# Patient Record
Sex: FEMALE | Race: WHITE | NOT HISPANIC OR LATINO | Employment: OTHER | URBAN - METROPOLITAN AREA
[De-identification: names, ages, dates, MRNs, and addresses within clinical notes are randomized per-mention and may not be internally consistent; named-entity substitution may affect disease eponyms.]

---

## 2021-02-11 ENCOUNTER — TELEPHONE ENCOUNTER (OUTPATIENT)
Dept: URBAN - METROPOLITAN AREA CLINIC 35 | Facility: CLINIC | Age: 71
End: 2021-02-11

## 2021-02-13 LAB
ABSOLUTE BASOPHILS: 62
ABSOLUTE EOSINOPHILS: 139
ABSOLUTE LYMPHOCYTES: 2433
ABSOLUTE MONOCYTES: 477
ABSOLUTE NEUTROPHILS: 4589
BASOPHILS: 0.8
EOSINOPHILS: 1.8
HEMATOCRIT: 43.8
HEMOGLOBIN: 15.1
LYMPHOCYTES: 31.6
MCH: 31.3
MCHC: 34.5
MCV: 90.9
MONOCYTES: 6.2
MPV: 10.9
NEUTROPHILS: 59.6
PLATELET COUNT: 278
RDW: 12.3
RED BLOOD CELL COUNT: 4.82
WHITE BLOOD CELL COUNT: 7.7

## 2021-02-15 ENCOUNTER — TELEPHONE ENCOUNTER (OUTPATIENT)
Dept: URBAN - METROPOLITAN AREA CLINIC 35 | Facility: CLINIC | Age: 71
End: 2021-02-15

## 2021-02-15 ENCOUNTER — OFFICE VISIT (OUTPATIENT)
Dept: URBAN - METROPOLITAN AREA CLINIC 35 | Facility: CLINIC | Age: 71
End: 2021-02-15

## 2021-02-15 VITALS
WEIGHT: 187 LBS | DIASTOLIC BLOOD PRESSURE: 61 MMHG | SYSTOLIC BLOOD PRESSURE: 121 MMHG | BODY MASS INDEX: 26.77 KG/M2 | HEIGHT: 70 IN

## 2021-02-15 RX ORDER — OXYCODONE HYDROCHLORIDE AND ACETAMINOPHEN 7.5; 325 MG/1; MG/1
1 TABLET AS NEEDED TABLET ORAL
Status: ACTIVE | COMMUNITY

## 2021-02-15 RX ORDER — HYDROCHLOROTHIAZIDE 25 MG/1
1 TABLET IN THE MORNING TABLET ORAL ONCE A DAY
Qty: 30 | Status: ACTIVE | COMMUNITY

## 2021-02-15 RX ORDER — SUCRALFATE 1 G/10ML
10 ML ON AN EMPTY STOMACH SUSPENSION ORAL
Qty: 1200 ML | Refills: 2 | Status: ACTIVE | COMMUNITY
Start: 2013-07-25

## 2021-02-15 RX ORDER — SODIUM PICOSULFATE, MAGNESIUM OXIDE, AND ANHYDROUS CITRIC ACID 10; 3.5; 12 MG/160ML; G/160ML; G/160ML
AS DIRECTED LIQUID ORAL
Qty: 1 KIT | Refills: 0 | Status: ON HOLD | COMMUNITY
Start: 2020-01-02

## 2021-02-15 RX ORDER — CITALOPRAM 20 MG/1
1 TABLET TABLET, FILM COATED ORAL ONCE A DAY
Qty: 30 | Status: ACTIVE | COMMUNITY

## 2021-02-15 RX ORDER — SUCRALFATE 1 G/1
1 TABLET BEFORE MEALS AND AT BEDTIME TABLET ORAL
Qty: 360 TABLET | Refills: 0 | Status: ACTIVE | COMMUNITY
Start: 2020-04-21

## 2021-02-15 RX ORDER — ZOLPIDEM TARTRATE 10 MG/1
1 TABLET AT BEDTIME AS NEEDED TABLET, FILM COATED ORAL ONCE A DAY
Status: ACTIVE | COMMUNITY

## 2021-02-15 RX ORDER — ATORVASTATIN CALCIUM 20 MG/1
1 TABLET TABLET, FILM COATED ORAL ONCE A DAY
Qty: 30 | Status: ACTIVE | COMMUNITY

## 2021-02-15 RX ORDER — COLESEVELAM HYDROCHLORIDE 3.75 G/1
1 PACKET MIXED WITH WATER WITH A MEAL FOR SUSPENSION ORAL ONCE A DAY
Qty: 30 | Refills: 3 | Status: ACTIVE | COMMUNITY
Start: 2020-01-02

## 2021-02-15 RX ORDER — SODIUM PICOSULFATE, MAGNESIUM OXIDE, AND ANHYDROUS CITRIC ACID 10; 3.5; 12 MG/160ML; G/160ML; G/160ML
160 ML LIQUID ORAL
Qty: 1 KIT | Refills: 0 | OUTPATIENT
Start: 2021-02-15

## 2021-02-15 RX ORDER — LEVOTHYROXINE SODIUM 0.09 MG/1
1 TABLET ON AN EMPTY STOMACH IN THE MORNING TABLET ORAL ONCE A DAY
Status: ACTIVE | COMMUNITY

## 2021-02-15 RX ORDER — SODIUM PICOSULFATE, MAGNESIUM OXIDE, AND ANHYDROUS CITRIC ACID 10; 3.5; 12 MG/160ML; G/160ML; G/160ML
AS DIRECTED LIQUID ORAL
Qty: 1 KIT | Refills: 0 | Status: ON HOLD | COMMUNITY
Start: 2019-10-02

## 2021-02-15 NOTE — HPI-MIGRATED HPI
;   ;     Melena : 71 y/o female patient presents today for a follow up of melena. Patient admits taking Pepto Bismol twice prior to this epiosde. She only had one episode of melena, and her CBC is normal.  Of note 3/23/2020 Patient c/o having darker loose stools, (not black) in color, but close to it over the past 2 days.  She took a dose of the Carafate liquid yesterday and noticed her stool color was improved.  She would like a refill of the Carafate 1gm suspension.  She denies current use of Pepto Bismol or Iron supplements. Admits to having occasional bloating/gas,  Abdominal pain that's on her left side sometimes either at the top or bottom of her abdomen.  She still continues to have diarrhea.  Currently reports 1 bowel movement per day with normal to loose with little form stools without blood or mucus. She admits melena. She admits the melena has since resolved since she stopped taking Peptol Bismol. Admits pruritus ani and rectal pain. She uses Reticare for relief of symptoms.;   Surveillance Colonoscopy : Patient is due for colonoscopy again, last one was 2020.  She was told to repeat in a year.  She admits to one BM daily.     Patient presents today for a repeat H&P for a colonoscopy which she has scheduled on 01/06/2020 with Dr. German. She currently admits one BM per day with some watery stools. She denies any changes since her last visit.  On last visit 10/02/2019, Patient is a 69-year-old  female who presents today for consultation for a colonoscopy. She has a personal history of colonic polyps and her last colonoscopy was in 2016 w/ Dr. German. Patient currently admits one bowel movement per day. Stools are soft and sometimes watery. Patient does admit occasional symptoms of bloating.  Patient admits to diarrhea she's had for years, even prior to her last colonoscopy.  COLONOSCOPY-IH 10/10/2016 revealed in the rectum a 6.5 mm polyp was seen and removed. First degree hemorrhoids. Mild to moderate diverticulosis in the sigmoid colon and descending colon. In the ascending colon x3 (9 mm, 6.5 mm and 4.5 mm) polyps seen and removed. Ileum not seen.   Pathology - Colonoscopy 10/10/2016 Colon, Ascending, Polyps (Polypectomy): Tubular adenoma X 2. Rectum, Polyp (Polypectomy): Benign colonic mucosa with lymphoid aggregate. No diagnostic evidence of hyperplastic or adenomatous epithelial changes.  ;

## 2021-02-18 ENCOUNTER — TELEPHONE ENCOUNTER (OUTPATIENT)
Dept: URBAN - METROPOLITAN AREA CLINIC 35 | Facility: CLINIC | Age: 71
End: 2021-02-18

## 2021-02-22 ENCOUNTER — TELEPHONE ENCOUNTER (OUTPATIENT)
Dept: URBAN - METROPOLITAN AREA CLINIC 35 | Facility: CLINIC | Age: 71
End: 2021-02-22

## 2021-03-12 ENCOUNTER — TELEPHONE ENCOUNTER (OUTPATIENT)
Dept: URBAN - METROPOLITAN AREA CLINIC 35 | Facility: CLINIC | Age: 71
End: 2021-03-12

## 2021-03-25 LAB
CALPROTECTIN, STOOL: 153
CLOSTRIDIUM DIFFICILE: (no result)
CONCENTRATE (1): (no result)
CULTURE, STOOL, SAL/SHIG/CAMPY AND SHIGA TOXINS EIA W/RFL E.COLI O157 CULT: (no result)
EIA (1): (no result)
TRICHROME (1): (no result)

## 2021-03-29 ENCOUNTER — TELEPHONE ENCOUNTER (OUTPATIENT)
Dept: URBAN - METROPOLITAN AREA CLINIC 35 | Facility: CLINIC | Age: 71
End: 2021-03-29

## 2021-04-05 ENCOUNTER — OFFICE VISIT (OUTPATIENT)
Dept: URBAN - METROPOLITAN AREA SURGERY CENTER 8 | Facility: SURGERY CENTER | Age: 71
End: 2021-04-05

## 2021-04-19 ENCOUNTER — OFFICE VISIT (OUTPATIENT)
Dept: URBAN - METROPOLITAN AREA CLINIC 35 | Facility: CLINIC | Age: 71
End: 2021-04-19

## 2021-04-19 VITALS
SYSTOLIC BLOOD PRESSURE: 138 MMHG | OXYGEN SATURATION: 100 % | BODY MASS INDEX: 27.06 KG/M2 | WEIGHT: 189 LBS | DIASTOLIC BLOOD PRESSURE: 78 MMHG | TEMPERATURE: 99 F | HEIGHT: 70 IN | HEART RATE: 78 BPM

## 2021-04-19 RX ORDER — ATORVASTATIN CALCIUM 20 MG/1
1 TABLET TABLET, FILM COATED ORAL ONCE A DAY
Qty: 30 | Status: ACTIVE | COMMUNITY

## 2021-04-19 RX ORDER — HYDROCHLOROTHIAZIDE 25 MG/1
1 TABLET IN THE MORNING TABLET ORAL ONCE A DAY
Qty: 30 | Status: ACTIVE | COMMUNITY

## 2021-04-19 RX ORDER — OXYCODONE HYDROCHLORIDE AND ACETAMINOPHEN 7.5; 325 MG/1; MG/1
1 TABLET AS NEEDED TABLET ORAL
Status: ON HOLD | COMMUNITY

## 2021-04-19 RX ORDER — SUCRALFATE 1 G/1
1 TABLET BEFORE MEALS AND AT BEDTIME TABLET ORAL
Qty: 360 TABLET | Refills: 0 | Status: ON HOLD | COMMUNITY
Start: 2020-04-21

## 2021-04-19 RX ORDER — SODIUM PICOSULFATE, MAGNESIUM OXIDE, AND ANHYDROUS CITRIC ACID 10; 3.5; 12 MG/160ML; G/160ML; G/160ML
AS DIRECTED LIQUID ORAL
Qty: 1 KIT | Refills: 0 | Status: ON HOLD | COMMUNITY
Start: 2020-01-02

## 2021-04-19 RX ORDER — SUCRALFATE 1 G/10ML
10 ML ON AN EMPTY STOMACH SUSPENSION ORAL
Qty: 1200 ML | Refills: 2 | Status: ACTIVE | COMMUNITY
Start: 2013-07-25

## 2021-04-19 RX ORDER — CITALOPRAM 40 MG/1
1 TABLET TABLET, FILM COATED ORAL ONCE A DAY
Status: ACTIVE | COMMUNITY

## 2021-04-19 RX ORDER — ZOLPIDEM TARTRATE 10 MG/1
1 TABLET AT BEDTIME AS NEEDED TABLET, FILM COATED ORAL ONCE A DAY
Status: ACTIVE | COMMUNITY

## 2021-04-19 RX ORDER — SODIUM PICOSULFATE, MAGNESIUM OXIDE, AND ANHYDROUS CITRIC ACID 10; 3.5; 12 MG/160ML; G/160ML; G/160ML
160 ML LIQUID ORAL
Qty: 1 KIT | Refills: 0 | Status: ON HOLD | COMMUNITY
Start: 2021-02-15

## 2021-04-19 RX ORDER — SODIUM PICOSULFATE, MAGNESIUM OXIDE, AND ANHYDROUS CITRIC ACID 10; 3.5; 12 MG/160ML; G/160ML; G/160ML
AS DIRECTED LIQUID ORAL
Qty: 1 KIT | Refills: 0 | Status: ON HOLD | COMMUNITY
Start: 2019-10-02

## 2021-04-19 RX ORDER — LEVOTHYROXINE SODIUM 0.09 MG/1
1 TABLET ON AN EMPTY STOMACH IN THE MORNING TABLET ORAL ONCE A DAY
Status: ACTIVE | COMMUNITY

## 2021-04-19 RX ORDER — COLESEVELAM HYDROCHLORIDE 3.75 G/1
1 PACKET MIXED WITH WATER WITH A MEAL FOR SUSPENSION ORAL ONCE A DAY
Qty: 30 | Refills: 3 | Status: ON HOLD | COMMUNITY
Start: 2020-01-02

## 2021-04-19 NOTE — HPI-MIGRATED HPI
;   ;     Melena : No episode of melena of since her last visit.    Last visit 02/15/2021 69 y/o female patient presents today for a follow up of melena. Patient admits taking Pepto Bismol twice prior to this episode. She only had one episode of melena, and her CBC is normal.  Of note 3/23/2020 Patient c/o having darker loose stools, (not black) in color, but close to it over the past 2 days.  She took a dose of the Carafate liquid yesterday and noticed her stool color was improved.  She would like a refill of the Carafate GM suspension.  She denies current use of Pepto Bismol or Iron supplements. Admits to having occasional bloating/gas,  Abdominal pain that's on her left side sometimes either at the top or bottom of her abdomen.  She still continues to have diarrhea.  Currently reports 1 bowel movement per day with normal to loose with little form stools without blood or mucus. She admits melena. She admits the melena has since resolved since she stopped taking Peptol Bismol. Admits pruritus ani and rectal pain. She uses Retic[employer] for relief of symptoms.;   Surveillance Colonoscopy :           Patient presents today for follow up to her colonoscopy.  Her colonoscopy was completed on 04/05/2021 by Dr German results noted below.  She denies any complications after her procedure. Currently has 1-2 bowel movements per day. Stools are loose with little form without any melena, blood or mucus.     Last visit 02/15/2021 Patient is due for colonoscopy again, last one was 2020.  She was told to repeat in a year.  She admits to one BM daily.     Patient presents today for a repeat H/P for a colonoscopy which she has scheduled on 01/06/2020 with Dr. German. She currently admits one BM per day with some watery stools. She denies any changes since her last visit.  On last visit 10/02/2019, Patient is a 69-year-old  female who presents today for consultation for a colonoscopy. She has a personal history of colonic polyps and her last colonoscopy was in 2016 w/ Dr. German. Patient currently admits one bowel movement per day. Stools are soft and sometimes watery. Patient does admit occasional symptoms of bloating.  Patient admits to diarrhea she's had for years, even prior to her last colonoscopy.  COLONOSCOPY- 10/10/2016 revealed in the rectum a 6.5 mm polyp was seen and removed. First degree hemorrhoids. Mild to moderate diverticulosis in the sigmoid colon and descending colon. In the ascending colon x3 (9 mm, 6.5 mm and 4.5 mm) polyps seen and removed. Ileum not seen.   Pathology - Colonoscopy 10/10/2016 Colon, Ascending, Polyps (Polypectomy): Tubular adenoma X 2. Rectum, Polyp (Polypectomy): Benign colonic mucosa with lymphoid aggregate. No diagnostic evidence of hyperplastic or adenomatous epithelial changes.;

## 2023-01-20 ENCOUNTER — TELEPHONE ENCOUNTER (OUTPATIENT)
Dept: URBAN - METROPOLITAN AREA CLINIC 35 | Facility: CLINIC | Age: 73
End: 2023-01-20

## 2023-01-20 ENCOUNTER — OFFICE VISIT (OUTPATIENT)
Dept: URBAN - METROPOLITAN AREA CLINIC 35 | Facility: CLINIC | Age: 73
End: 2023-01-20
Payer: MEDICARE

## 2023-01-20 VITALS
OXYGEN SATURATION: 97 % | WEIGHT: 190 LBS | BODY MASS INDEX: 27.2 KG/M2 | HEART RATE: 78 BPM | DIASTOLIC BLOOD PRESSURE: 72 MMHG | HEIGHT: 70 IN | SYSTOLIC BLOOD PRESSURE: 106 MMHG

## 2023-01-20 DIAGNOSIS — R10.12 LEFT UPPER QUADRANT ABDOMINAL PAIN: ICD-10-CM

## 2023-01-20 DIAGNOSIS — Z86.010 PERSONAL HISTORY OF COLONIC POLYPS: ICD-10-CM

## 2023-01-20 DIAGNOSIS — K59.1 FUNCTIONAL DIARRHEA: ICD-10-CM

## 2023-01-20 DIAGNOSIS — R14.3 FLATULENCE: ICD-10-CM

## 2023-01-20 PROBLEM — 428283002 HISTORY OF POLYP OF COLON (SITUATION): Status: ACTIVE | Noted: 2019-10-02

## 2023-01-20 PROCEDURE — 99214 OFFICE O/P EST MOD 30 MIN: CPT | Performed by: PHYSICIAN ASSISTANT

## 2023-01-20 RX ORDER — ZOLPIDEM TARTRATE 10 MG/1
1 TABLET AT BEDTIME AS NEEDED TABLET, FILM COATED ORAL ONCE A DAY
Status: ON HOLD | COMMUNITY

## 2023-01-20 RX ORDER — OXYCODONE HYDROCHLORIDE AND ACETAMINOPHEN 7.5; 325 MG/1; MG/1
1 TABLET AS NEEDED TABLET ORAL
Status: ON HOLD | COMMUNITY

## 2023-01-20 RX ORDER — LEVOTHYROXINE SODIUM 0.09 MG/1
1 TABLET ON AN EMPTY STOMACH IN THE MORNING TABLET ORAL ONCE A DAY
Status: ACTIVE | COMMUNITY

## 2023-01-20 RX ORDER — SUCRALFATE 1 G/10ML
10 ML ON AN EMPTY STOMACH SUSPENSION ORAL
Qty: 1200 ML | Refills: 2 | Status: DISCONTINUED | COMMUNITY
Start: 2013-07-25

## 2023-01-20 RX ORDER — SUCRALFATE 1 G/1
1 TABLET BEFORE MEALS AND AT BEDTIME TABLET ORAL
Qty: 360 TABLET | Refills: 0 | Status: ON HOLD | COMMUNITY
Start: 2020-04-21

## 2023-01-20 RX ORDER — ATORVASTATIN CALCIUM 20 MG/1
1 TABLET TABLET, FILM COATED ORAL ONCE A DAY
Qty: 30 | Status: ACTIVE | COMMUNITY

## 2023-01-20 RX ORDER — SUCRALFATE 1 G/10ML
10 ML ON AN EMPTY STOMACH SUSPENSION ORAL TWICE A DAY
Qty: 600 | Refills: 0 | OUTPATIENT
Start: 2023-01-20 | End: 2023-02-19

## 2023-01-20 RX ORDER — COLESEVELAM HYDROCHLORIDE 3.75 G/1
1 PACKET MIXED WITH WATER WITH A MEAL FOR SUSPENSION ORAL ONCE A DAY
Qty: 30 | Refills: 3 | Status: ON HOLD | COMMUNITY
Start: 2020-01-02

## 2023-01-20 RX ORDER — CITALOPRAM 40 MG/1
1 TABLET TABLET, FILM COATED ORAL ONCE A DAY
Status: ACTIVE | COMMUNITY

## 2023-01-20 RX ORDER — SODIUM PICOSULFATE, MAGNESIUM OXIDE, AND ANHYDROUS CITRIC ACID 10; 3.5; 12 MG/160ML; G/160ML; G/160ML
AS DIRECTED LIQUID ORAL
Qty: 1 KIT | Refills: 0 | Status: ON HOLD | COMMUNITY
Start: 2020-01-02

## 2023-01-20 RX ORDER — HYDROCHLOROTHIAZIDE 25 MG/1
1 TABLET IN THE MORNING TABLET ORAL ONCE A DAY
Qty: 30 | Status: ACTIVE | COMMUNITY

## 2023-01-20 NOTE — HPI-SURVEILLANCE COLONOSCOPY
71 y/o female patient presents today for a surveillance colonoscopy. Her last colonoscopy was in 2021 with findings of colon polyps. She admits a family history of colon polyps. Currently reports one to two bowel movements per day without strain. Her stools are diarrhea  without blood, mucus, or melena. She denies any episodes of rectal pain or pruritus ani. Patient has a prolapsed uterus, colon and bladder.

## 2023-01-20 NOTE — HPI-GAS
Patient admits to being gaseous.  She states stools are normal or diarrhea, but that's typical for her.  She has been having abd pain in her lower abdomen, possibly from the rectal proplapse stage 4.  She has pain in her LUQ as well.    She denies any heartburn.  She does state she has a dull ache there that's intermittent. She denies any provoking or palliative factors.  She will take Hydrocodone prn which helps the pain. History of bleeding gastric ulcer.

## 2023-02-17 ENCOUNTER — TELEPHONE ENCOUNTER (OUTPATIENT)
Dept: URBAN - METROPOLITAN AREA CLINIC 35 | Facility: CLINIC | Age: 73
End: 2023-02-17

## 2023-02-17 RX ORDER — SUCRALFATE 1 G/10ML
10 ML ON AN EMPTY STOMACH SUSPENSION ORAL TWICE A DAY
Qty: 600 | Refills: 0
Start: 2023-01-20 | End: 2023-03-19

## 2023-03-15 ENCOUNTER — TELEPHONE ENCOUNTER (OUTPATIENT)
Dept: URBAN - METROPOLITAN AREA CLINIC 35 | Facility: CLINIC | Age: 73
End: 2023-03-15

## 2023-05-03 ENCOUNTER — TELEPHONE ENCOUNTER (OUTPATIENT)
Dept: URBAN - METROPOLITAN AREA CLINIC 35 | Facility: CLINIC | Age: 73
End: 2023-05-03

## 2023-05-03 RX ORDER — SUCRALFATE 1 G/1
1 TABLET BEFORE MEALS AND AT BEDTIME TABLET ORAL
Qty: 120 | Refills: 0
Start: 2020-04-21 | End: 2023-06-02

## 2023-05-22 ENCOUNTER — TELEPHONE ENCOUNTER (OUTPATIENT)
Dept: URBAN - METROPOLITAN AREA CLINIC 35 | Facility: CLINIC | Age: 73
End: 2023-05-22

## 2023-07-06 ENCOUNTER — LAB OUTSIDE AN ENCOUNTER (OUTPATIENT)
Dept: URBAN - METROPOLITAN AREA CLINIC 19 | Facility: CLINIC | Age: 73
End: 2023-07-06

## 2023-07-06 ENCOUNTER — OFFICE VISIT (OUTPATIENT)
Dept: URBAN - METROPOLITAN AREA CLINIC 19 | Facility: CLINIC | Age: 73
End: 2023-07-06
Payer: MEDICARE

## 2023-07-06 ENCOUNTER — WEB ENCOUNTER (OUTPATIENT)
Dept: URBAN - METROPOLITAN AREA CLINIC 19 | Facility: CLINIC | Age: 73
End: 2023-07-06

## 2023-07-06 VITALS
DIASTOLIC BLOOD PRESSURE: 84 MMHG | SYSTOLIC BLOOD PRESSURE: 132 MMHG | HEIGHT: 70 IN | OXYGEN SATURATION: 97 % | TEMPERATURE: 97.3 F | WEIGHT: 194.8 LBS | HEART RATE: 81 BPM | BODY MASS INDEX: 27.89 KG/M2

## 2023-07-06 DIAGNOSIS — K83.8 COMMON BILE DUCT DILATATION: ICD-10-CM

## 2023-07-06 DIAGNOSIS — Z86.010 PERSONAL HISTORY OF COLONIC POLYPS: ICD-10-CM

## 2023-07-06 DIAGNOSIS — R19.5 POSITIVE COLORECTAL CANCER SCREENING USING COLOGUARD TEST: ICD-10-CM

## 2023-07-06 PROBLEM — 123608004: Status: ACTIVE | Noted: 2023-07-06

## 2023-07-06 PROCEDURE — 99214 OFFICE O/P EST MOD 30 MIN: CPT | Performed by: NURSE PRACTITIONER

## 2023-07-06 RX ORDER — CITALOPRAM 40 MG/1
1 TABLET TABLET, FILM COATED ORAL ONCE A DAY
Status: ACTIVE | COMMUNITY

## 2023-07-06 RX ORDER — COLESEVELAM HYDROCHLORIDE 3.75 G/1
1 PACKET MIXED WITH WATER WITH A MEAL FOR SUSPENSION ORAL ONCE A DAY
Qty: 30 | Refills: 3 | Status: ACTIVE | COMMUNITY
Start: 2020-01-02

## 2023-07-06 RX ORDER — ZOLPIDEM TARTRATE 10 MG/1
1 TABLET AT BEDTIME AS NEEDED TABLET, FILM COATED ORAL ONCE A DAY
Status: ACTIVE | COMMUNITY

## 2023-07-06 RX ORDER — LEVOTHYROXINE SODIUM 0.09 MG/1
1 TABLET ON AN EMPTY STOMACH IN THE MORNING TABLET ORAL ONCE A DAY
Status: ACTIVE | COMMUNITY

## 2023-07-06 RX ORDER — OXYCODONE HYDROCHLORIDE AND ACETAMINOPHEN 7.5; 325 MG/1; MG/1
1 TABLET AS NEEDED TABLET ORAL
Status: ACTIVE | COMMUNITY

## 2023-07-06 RX ORDER — SODIUM PICOSULFATE, MAGNESIUM OXIDE, AND ANHYDROUS CITRIC ACID 10; 3.5; 12 MG/160ML; G/160ML; G/160ML
AS DIRECTED LIQUID ORAL
Qty: 1 KIT | Refills: 0 | Status: ACTIVE | COMMUNITY
Start: 2020-01-02

## 2023-07-06 RX ORDER — ATORVASTATIN CALCIUM 20 MG/1
1 TABLET TABLET, FILM COATED ORAL ONCE A DAY
Qty: 30 | Status: ACTIVE | COMMUNITY

## 2023-07-06 RX ORDER — HYDROCHLOROTHIAZIDE 25 MG/1
1 TABLET IN THE MORNING TABLET ORAL ONCE A DAY
Qty: 30 | Status: ACTIVE | COMMUNITY

## 2023-07-06 NOTE — HPI-TODAY'S VISIT:
Ms. Hennessy is a 74 yo female with PMH on Liu-en-y in 2000, colon polyps, piecemeal removal adenoma, pelvic prolapse, bleeding gastric ulcer, back surgeries, hysterectomy, rectocele who presents today for positive cologuard.  Last seen in GI clinic in the [facility] 1/20/2023 for complaints of LUQ abdominal pain.  EGD was ordered but do not see that it was done. She was seeing Dr. German then who has since retired. She states he wanted her to get her hysterectomy first.   Last colonoscopy was 4/5/2021 - 11 mm polyp in the cecum, 7 mm polyp in the transverse colon, 5 mm polyp in the descending colon, two 5 to 8 mm polyps in the proximal ascending colon, 12 mm polyp in the proximal ascending colon.  Diverticulosis in the sigmoid and descending colon.  Internal hemorrhoids.  Ileum was normal.  Path consistent with BX of benign polyps and tubular adenomas 2-year follow-up given.  She denies having any dysphagia, heartburn/reflux, epigastric abdominal pain, N/V. Having normal consistency BMs regularly. No bloody or black stools. Reports having occasional lower abdominal aching.  MRI pancreas with and without contrast.  Was done on 3/9/2023 3-moderate dilatation of the biliary duct/common bile duct, as well as of the pancreatic duct.  Both of these are dilated to the level of the ampulla.  No obvious pancreatic mass or choledocholithiasis.  However a small ampullary lesion or stenosis would be difficult to exclude on the basis of his exam.  Consider endoscopic evaluation/ERCP to further evaluate as clinically indicated.  This appears overall progressed since 9/27/2021..

## 2023-07-07 ENCOUNTER — TELEPHONE ENCOUNTER (OUTPATIENT)
Dept: URBAN - METROPOLITAN AREA CLINIC 19 | Facility: CLINIC | Age: 73
End: 2023-07-07

## 2023-07-07 LAB
A/G RATIO: 1.7
ALBUMIN: 4.4
ALKALINE PHOSPHATASE: 106
ALT (SGPT): 40
AST (SGOT): 38
BILIRUBIN, TOTAL: 0.5
BUN/CREATININE RATIO: (no result)
BUN: 17
CALCIUM: 9.4
CARBON DIOXIDE, TOTAL: 28
CHLORIDE: 99
CREATININE: 0.89
EGFR: 68
GLOBULIN, TOTAL: 2.6
GLUCOSE: 82
HEMATOCRIT: 41.8
HEMOGLOBIN: 14
LIPASE: 36
MCH: 30.8
MCHC: 33.5
MCV: 92.1
MPV: 10.7
PLATELET COUNT: 223
POTASSIUM: 4
PROTEIN, TOTAL: 7
RDW: 12
RED BLOOD CELL COUNT: 4.54
SODIUM: 138
WHITE BLOOD CELL COUNT: 6.9

## 2023-08-17 ENCOUNTER — TELEPHONE ENCOUNTER (OUTPATIENT)
Dept: URBAN - METROPOLITAN AREA CLINIC 19 | Facility: CLINIC | Age: 73
End: 2023-08-17

## 2023-08-24 ENCOUNTER — OFFICE VISIT (OUTPATIENT)
Dept: URBAN - METROPOLITAN AREA SURGERY CENTER 31 | Facility: SURGERY CENTER | Age: 73
End: 2023-08-24
Payer: MEDICARE

## 2023-08-24 ENCOUNTER — CLAIMS CREATED FROM THE CLAIM WINDOW (OUTPATIENT)
Dept: URBAN - METROPOLITAN AREA CLINIC 4 | Facility: CLINIC | Age: 73
End: 2023-08-24
Payer: MEDICARE

## 2023-08-24 DIAGNOSIS — R19.5 ABNORMAL CONSISTENCY OF STOOL: ICD-10-CM

## 2023-08-24 DIAGNOSIS — D12.3 BENIGN NEOPLASM OF TRANSVERSE COLON: ICD-10-CM

## 2023-08-24 DIAGNOSIS — D12.3 ADENOMA OF TRANSVERSE COLON: ICD-10-CM

## 2023-08-24 PROCEDURE — G8907 PT DOC NO EVENTS ON DISCHARG: HCPCS | Performed by: INTERNAL MEDICINE

## 2023-08-24 PROCEDURE — 45385 COLONOSCOPY W/LESION REMOVAL: CPT | Performed by: INTERNAL MEDICINE

## 2023-08-24 PROCEDURE — 88305 TISSUE EXAM BY PATHOLOGIST: CPT | Performed by: PATHOLOGY

## 2023-11-10 ENCOUNTER — OFFICE VISIT (OUTPATIENT)
Dept: URBAN - METROPOLITAN AREA SURGERY CENTER 31 | Facility: SURGERY CENTER | Age: 73
End: 2023-11-10
Payer: MEDICARE

## 2023-11-10 ENCOUNTER — CLAIMS CREATED FROM THE CLAIM WINDOW (OUTPATIENT)
Dept: URBAN - METROPOLITAN AREA CLINIC 4 | Facility: CLINIC | Age: 73
End: 2023-11-10
Payer: MEDICARE

## 2023-11-10 DIAGNOSIS — K29.40 CHRONIC EROSIVE GASTRITIS: ICD-10-CM

## 2023-11-10 DIAGNOSIS — K29.60 ADENOPAPILLOMATOSIS GASTRICA: ICD-10-CM

## 2023-11-10 DIAGNOSIS — B37.81 CANDIDAL ESOPHAGITIS: ICD-10-CM

## 2023-11-10 DIAGNOSIS — K28.9 ANASTOMOTIC ULCER: ICD-10-CM

## 2023-11-10 DIAGNOSIS — Z98.84 GASTRIC BYPASS STATUS FOR OBESITY: ICD-10-CM

## 2023-11-10 DIAGNOSIS — Z98.0 INTESTINAL BYPASS AND ANASTOMOSIS STATUS: ICD-10-CM

## 2023-11-10 DIAGNOSIS — K28.4 GASTROJEJUNAL ULCER WITH HEMORRHAGE: ICD-10-CM

## 2023-11-10 DIAGNOSIS — K29.70 GASTRITIS, UNSPECIFIED, WITHOUT BLEEDING: ICD-10-CM

## 2023-11-10 PROCEDURE — 88305 TISSUE EXAM BY PATHOLOGIST: CPT | Performed by: PATHOLOGY

## 2023-11-10 PROCEDURE — G8907 PT DOC NO EVENTS ON DISCHARG: HCPCS | Performed by: INTERNAL MEDICINE

## 2023-11-10 PROCEDURE — 43239 EGD BIOPSY SINGLE/MULTIPLE: CPT | Performed by: INTERNAL MEDICINE

## 2023-11-10 PROCEDURE — 88342 IMHCHEM/IMCYTCHM 1ST ANTB: CPT | Performed by: PATHOLOGY

## 2023-11-10 PROCEDURE — 00731 ANES UPR GI NDSC PX NOS: CPT | Performed by: NURSE ANESTHETIST, CERTIFIED REGISTERED

## 2023-11-10 RX ORDER — ZOLPIDEM TARTRATE 10 MG/1
1 TABLET AT BEDTIME AS NEEDED TABLET, FILM COATED ORAL ONCE A DAY
Status: ACTIVE | COMMUNITY

## 2023-11-10 RX ORDER — CITALOPRAM 40 MG/1
1 TABLET TABLET, FILM COATED ORAL ONCE A DAY
Status: ACTIVE | COMMUNITY

## 2023-11-10 RX ORDER — HYDROCHLOROTHIAZIDE 25 MG/1
1 TABLET IN THE MORNING TABLET ORAL ONCE A DAY
Qty: 30 | Status: ACTIVE | COMMUNITY

## 2023-11-10 RX ORDER — SODIUM PICOSULFATE, MAGNESIUM OXIDE, AND ANHYDROUS CITRIC ACID 10; 3.5; 12 MG/160ML; G/160ML; G/160ML
AS DIRECTED LIQUID ORAL
Qty: 1 KIT | Refills: 0 | Status: ACTIVE | COMMUNITY
Start: 2020-01-02

## 2023-11-10 RX ORDER — FLUCONAZOLE 100 MG/1
1 TABLET TABLET ORAL ONCE A DAY
Qty: 10 TABLET | Refills: 0 | OUTPATIENT
Start: 2023-11-10 | End: 2023-11-20

## 2023-11-10 RX ORDER — LEVOTHYROXINE SODIUM 0.09 MG/1
1 TABLET ON AN EMPTY STOMACH IN THE MORNING TABLET ORAL ONCE A DAY
Status: ACTIVE | COMMUNITY

## 2023-11-10 RX ORDER — OXYCODONE HYDROCHLORIDE AND ACETAMINOPHEN 7.5; 325 MG/1; MG/1
1 TABLET AS NEEDED TABLET ORAL
Status: ACTIVE | COMMUNITY

## 2023-11-10 RX ORDER — ATORVASTATIN CALCIUM 20 MG/1
1 TABLET TABLET, FILM COATED ORAL ONCE A DAY
Qty: 30 | Status: ACTIVE | COMMUNITY

## 2023-11-10 RX ORDER — COLESEVELAM HYDROCHLORIDE 3.75 G/1
1 PACKET MIXED WITH WATER WITH A MEAL FOR SUSPENSION ORAL ONCE A DAY
Qty: 30 | Refills: 3 | Status: ACTIVE | COMMUNITY
Start: 2020-01-02

## 2024-01-13 ENCOUNTER — CLAIMS CREATED FROM THE CLAIM WINDOW (OUTPATIENT)
Dept: URBAN - METROPOLITAN AREA MEDICAL CENTER 25 | Facility: MEDICAL CENTER | Age: 74
End: 2024-01-13
Payer: MEDICARE

## 2024-01-13 DIAGNOSIS — K20.80 ABSCESS OF ESOPHAGUS: ICD-10-CM

## 2024-01-13 DIAGNOSIS — R19.5 ABNORMAL CONSISTENCY OF STOOL: ICD-10-CM

## 2024-01-13 DIAGNOSIS — R74.8 ABNORMAL ALKALINE PHOSPHATASE TEST: ICD-10-CM

## 2024-01-13 DIAGNOSIS — R10.13 ABDOMINAL DISCOMFORT, EPIGASTRIC: ICD-10-CM

## 2024-01-13 DIAGNOSIS — R11.2 ACUTE NAUSEA WITH NONBILIOUS VOMITING: ICD-10-CM

## 2024-01-13 DIAGNOSIS — K29.50 ANTRAL GASTRITIS: ICD-10-CM

## 2024-01-13 PROCEDURE — G8427 DOCREV CUR MEDS BY ELIG CLIN: HCPCS | Performed by: INTERNAL MEDICINE

## 2024-01-13 PROCEDURE — 99254 IP/OBS CNSLTJ NEW/EST MOD 60: CPT | Performed by: INTERNAL MEDICINE

## 2024-01-13 PROCEDURE — 43235 EGD DIAGNOSTIC BRUSH WASH: CPT | Performed by: INTERNAL MEDICINE

## 2024-01-13 PROCEDURE — 99222 1ST HOSP IP/OBS MODERATE 55: CPT | Performed by: INTERNAL MEDICINE

## 2024-01-13 PROCEDURE — 43239 EGD BIOPSY SINGLE/MULTIPLE: CPT | Performed by: INTERNAL MEDICINE

## 2024-01-14 ENCOUNTER — CLAIMS CREATED FROM THE CLAIM WINDOW (OUTPATIENT)
Dept: URBAN - METROPOLITAN AREA MEDICAL CENTER 25 | Facility: MEDICAL CENTER | Age: 74
End: 2024-01-14
Payer: MEDICARE

## 2024-01-14 DIAGNOSIS — K92.2 ACUTE GASTROINTESTINAL BLEEDING: ICD-10-CM

## 2024-01-14 DIAGNOSIS — R93.3 ABN FINDINGS-GI TRACT: ICD-10-CM

## 2024-01-14 DIAGNOSIS — D64.89 ANEMIA DUE TO OTHER CAUSE: ICD-10-CM

## 2024-01-14 DIAGNOSIS — R74.8 ABNORMAL ALKALINE PHOSPHATASE TEST: ICD-10-CM

## 2024-01-14 PROCEDURE — 99232 SBSQ HOSP IP/OBS MODERATE 35: CPT | Performed by: INTERNAL MEDICINE

## 2024-01-18 ENCOUNTER — TELEPHONE ENCOUNTER (OUTPATIENT)
Dept: URBAN - METROPOLITAN AREA CLINIC 19 | Facility: CLINIC | Age: 74
End: 2024-01-18

## 2024-01-29 ENCOUNTER — OFFICE VISIT (OUTPATIENT)
Dept: URBAN - METROPOLITAN AREA CLINIC 19 | Facility: CLINIC | Age: 74
End: 2024-01-29
Payer: MEDICARE

## 2024-01-29 ENCOUNTER — DASHBOARD ENCOUNTERS (OUTPATIENT)
Age: 74
End: 2024-01-29

## 2024-01-29 VITALS
TEMPERATURE: 97.3 F | SYSTOLIC BLOOD PRESSURE: 128 MMHG | WEIGHT: 182 LBS | DIASTOLIC BLOOD PRESSURE: 71 MMHG | HEART RATE: 79 BPM | BODY MASS INDEX: 26.05 KG/M2 | HEIGHT: 70 IN

## 2024-01-29 DIAGNOSIS — R79.89 ABNORMAL LFTS: ICD-10-CM

## 2024-01-29 DIAGNOSIS — K29.50 OTHER CHRONIC GASTRITIS WITHOUT HEMORRHAGE: ICD-10-CM

## 2024-01-29 DIAGNOSIS — K59.02 CONSTIPATION DUE TO OUTLET DYSFUNCTION: ICD-10-CM

## 2024-01-29 PROBLEM — 14760008: Status: ACTIVE | Noted: 2024-01-29

## 2024-01-29 PROBLEM — 8493009: Status: ACTIVE | Noted: 2024-01-29

## 2024-01-29 PROCEDURE — 99214 OFFICE O/P EST MOD 30 MIN: CPT | Performed by: INTERNAL MEDICINE

## 2024-01-29 RX ORDER — OXYCODONE HYDROCHLORIDE AND ACETAMINOPHEN 7.5; 325 MG/1; MG/1
1 TABLET AS NEEDED TABLET ORAL
COMMUNITY

## 2024-01-29 RX ORDER — ZOLPIDEM TARTRATE 10 MG/1
1 TABLET AT BEDTIME AS NEEDED TABLET, FILM COATED ORAL ONCE A DAY
COMMUNITY

## 2024-01-29 RX ORDER — SODIUM PICOSULFATE, MAGNESIUM OXIDE, AND ANHYDROUS CITRIC ACID 10; 3.5; 12 MG/160ML; G/160ML; G/160ML
AS DIRECTED LIQUID ORAL
Qty: 1 KIT | Refills: 0 | COMMUNITY
Start: 2020-01-02

## 2024-01-29 RX ORDER — CITALOPRAM 40 MG/1
1 TABLET TABLET, FILM COATED ORAL ONCE A DAY
COMMUNITY

## 2024-01-29 RX ORDER — COLESEVELAM HYDROCHLORIDE 3.75 G/1
1 PACKET MIXED WITH WATER WITH A MEAL FOR SUSPENSION ORAL ONCE A DAY
Qty: 30 | Refills: 3 | COMMUNITY
Start: 2020-01-02

## 2024-01-29 RX ORDER — ATORVASTATIN CALCIUM 20 MG/1
1 TABLET TABLET, FILM COATED ORAL ONCE A DAY
Qty: 30 | COMMUNITY

## 2024-01-29 RX ORDER — LEVOTHYROXINE SODIUM 0.09 MG/1
1 TABLET ON AN EMPTY STOMACH IN THE MORNING TABLET ORAL ONCE A DAY
COMMUNITY

## 2024-01-29 RX ORDER — HYDROCHLOROTHIAZIDE 25 MG/1
1 TABLET IN THE MORNING TABLET ORAL ONCE A DAY
Qty: 30 | COMMUNITY

## 2024-01-29 NOTE — HPI-TODAY'S VISIT:
Ms. Hennessy is a 74 yo female with PMH on Liu-en-y in 2000, colon polyps, piecemeal removal adenoma, pelvic prolapse, bleeding gastric ulcer, back surgeries, hysterectomy, rectocele who presents today for positive cologuard.  Last seen in GI clinic in the [facility] 1/20/2023 for complaints of LUQ abdominal pain.  EGD was ordered but do not see that it was done. She was seeing Dr. Monserrat ferguson who has since retired. She states he wanted her to get her hysterectomy first.   Last colonoscopy was 4/5/2021 - 11 mm polyp in the cecum, 7 mm polyp in the transverse colon, 5 mm polyp in the descending colon, two 5 to 8 mm polyps in the proximal ascending colon, 12 mm polyp in the proximal ascending colon.  Diverticulosis in the sigmoid and descending colon.  Internal hemorrhoids.  Ileum was normal.  Path consistent with BX of benign polyps and tubular adenomas 2-year follow-up given.  She denies having any dysphagia, heartburn/reflux, epigastric abdominal pain, N/V. Having normal consistency BMs regularly. No bloody or black stools. Reports having occasional lower abdominal aching.  MRI pancreas with and without contrast.  Was done on 3/9/2023 3-moderate dilatation of the biliary duct/common bile duct, as well as of the pancreatic duct.  Both of these are dilated to the level of the ampulla.  No obvious pancreatic mass or choledocholithiasis.  However a small ampullary lesion or stenosis would be difficult to exclude on the basis of his exam.  Consider endoscopic evaluation/ERCP to further evaluate as clinically indicated.  This appears overall progressed since 9/27/2021.  1/29/24  Ms. Hennessy is a 72-year-old woman here for follow-up.  She was recently hospitalized 2 weeks ago with complaints of black stools.  Her hemoglobin had dropped from 13.5-11.5.  Her LFTs were also elevated with an AST of 246 and ALT of 236 alkaline phosphatase of 203 and total bilirubin of 1.2.  She did admit to drinking a botle of wine a day.  An urgent EGD was done by Dr. Helms and it showed gastritis with irregular GE junction.  However biopsies were negative for Cotto's esophagus.  Gastritis was noted. She also has been on oxycodone 4 times daily chronicallly for back pain issues- [facility]. Feels more constipated than usual. Already has a rectocele and sling operation. OTC do not work.

## 2025-06-30 ENCOUNTER — TELEPHONE ENCOUNTER (OUTPATIENT)
Dept: URBAN - METROPOLITAN AREA CLINIC 19 | Facility: CLINIC | Age: 75
End: 2025-06-30

## 2025-07-21 ENCOUNTER — OFFICE VISIT (OUTPATIENT)
Dept: URBAN - METROPOLITAN AREA CLINIC 19 | Facility: CLINIC | Age: 75
End: 2025-07-21

## 2025-08-04 ENCOUNTER — OFFICE VISIT (OUTPATIENT)
Dept: URBAN - METROPOLITAN AREA CLINIC 19 | Facility: CLINIC | Age: 75
End: 2025-08-04
Payer: MEDICARE

## 2025-08-04 DIAGNOSIS — K92.1 MELENA: ICD-10-CM

## 2025-08-04 PROCEDURE — 99213 OFFICE O/P EST LOW 20 MIN: CPT | Performed by: INTERNAL MEDICINE

## 2025-08-04 RX ORDER — HYDROCHLOROTHIAZIDE 25 MG/1
1 TABLET IN THE MORNING TABLET ORAL ONCE A DAY
Qty: 30 | Status: ACTIVE | COMMUNITY

## 2025-08-04 RX ORDER — ATORVASTATIN CALCIUM 20 MG/1
1 TABLET TABLET, FILM COATED ORAL ONCE A DAY
Qty: 30 | Status: ACTIVE | COMMUNITY

## 2025-08-04 RX ORDER — SODIUM PICOSULFATE, MAGNESIUM OXIDE, AND ANHYDROUS CITRIC ACID 10; 3.5; 12 MG/160ML; G/160ML; G/160ML
AS DIRECTED LIQUID ORAL
Qty: 1 KIT | Refills: 0 | Status: ACTIVE | COMMUNITY
Start: 2020-01-02

## 2025-08-04 RX ORDER — OXYCODONE HYDROCHLORIDE AND ACETAMINOPHEN 7.5; 325 MG/1; MG/1
1 TABLET AS NEEDED TABLET ORAL
Status: ACTIVE | COMMUNITY

## 2025-08-04 RX ORDER — LEVOTHYROXINE SODIUM 0.09 MG/1
1 TABLET ON AN EMPTY STOMACH IN THE MORNING TABLET ORAL ONCE A DAY
Status: ACTIVE | COMMUNITY

## 2025-08-04 RX ORDER — CITALOPRAM 40 MG/1
1 TABLET TABLET, FILM COATED ORAL ONCE A DAY
Status: ACTIVE | COMMUNITY

## 2025-08-04 RX ORDER — COLESEVELAM HYDROCHLORIDE 3.75 G/1
1 PACKET MIXED WITH WATER WITH A MEAL FOR SUSPENSION ORAL ONCE A DAY
Qty: 30 | Refills: 3 | Status: ACTIVE | COMMUNITY
Start: 2020-01-02

## 2025-08-04 RX ORDER — ZOLPIDEM TARTRATE 10 MG/1
1 TABLET AT BEDTIME AS NEEDED TABLET, FILM COATED ORAL ONCE A DAY
Status: ACTIVE | COMMUNITY

## 2025-08-24 ENCOUNTER — WEB ENCOUNTER (OUTPATIENT)
Dept: URBAN - METROPOLITAN AREA CLINIC 19 | Facility: CLINIC | Age: 75
End: 2025-08-24